# Patient Record
Sex: MALE | Race: BLACK OR AFRICAN AMERICAN
[De-identification: names, ages, dates, MRNs, and addresses within clinical notes are randomized per-mention and may not be internally consistent; named-entity substitution may affect disease eponyms.]

---

## 2018-01-14 ENCOUNTER — HOSPITAL ENCOUNTER (OUTPATIENT)
Dept: HOSPITAL 17 - NEPE | Age: 63
Setting detail: OBSERVATION
LOS: 1 days | Discharge: HOME | End: 2018-01-15
Payer: OTHER GOVERNMENT

## 2018-01-14 VITALS — WEIGHT: 198.42 LBS | HEIGHT: 73 IN | BODY MASS INDEX: 26.3 KG/M2

## 2018-01-14 VITALS
DIASTOLIC BLOOD PRESSURE: 74 MMHG | TEMPERATURE: 98 F | HEART RATE: 79 BPM | OXYGEN SATURATION: 99 % | RESPIRATION RATE: 16 BRPM | SYSTOLIC BLOOD PRESSURE: 153 MMHG

## 2018-01-14 DIAGNOSIS — M54.9: ICD-10-CM

## 2018-01-14 DIAGNOSIS — R06.00: ICD-10-CM

## 2018-01-14 DIAGNOSIS — G89.29: ICD-10-CM

## 2018-01-14 DIAGNOSIS — M25.562: ICD-10-CM

## 2018-01-14 DIAGNOSIS — Z79.82: ICD-10-CM

## 2018-01-14 DIAGNOSIS — R07.89: Primary | ICD-10-CM

## 2018-01-14 DIAGNOSIS — R11.0: ICD-10-CM

## 2018-01-14 DIAGNOSIS — M25.561: ICD-10-CM

## 2018-01-14 DIAGNOSIS — I20.9: ICD-10-CM

## 2018-01-14 LAB
ALBUMIN SERPL-MCNC: 4.1 GM/DL (ref 3.4–5)
ALP SERPL-CCNC: 78 U/L (ref 45–117)
ALT SERPL-CCNC: 41 U/L (ref 12–78)
AST SERPL-CCNC: 28 U/L (ref 15–37)
BASOPHILS # BLD AUTO: 0.1 TH/MM3 (ref 0–0.2)
BASOPHILS NFR BLD: 0.7 % (ref 0–2)
BILIRUB SERPL-MCNC: 0.3 MG/DL (ref 0.2–1)
BUN SERPL-MCNC: 12 MG/DL (ref 7–18)
CALCIUM SERPL-MCNC: 9.4 MG/DL (ref 8.5–10.1)
CHLORIDE SERPL-SCNC: 103 MEQ/L (ref 98–107)
CREAT SERPL-MCNC: 1.04 MG/DL (ref 0.6–1.3)
EOSINOPHIL # BLD: 0.3 TH/MM3 (ref 0–0.4)
EOSINOPHIL NFR BLD: 3.2 % (ref 0–4)
ERYTHROCYTE [DISTWIDTH] IN BLOOD BY AUTOMATED COUNT: 12.1 % (ref 11.6–17.2)
GFR SERPLBLD BASED ON 1.73 SQ M-ARVRAT: 73 ML/MIN (ref 89–?)
GLUCOSE SERPL-MCNC: 93 MG/DL (ref 74–106)
HCO3 BLD-SCNC: 28.8 MEQ/L (ref 21–32)
HCT VFR BLD CALC: 39.3 % (ref 39–51)
HGB BLD-MCNC: 13.5 GM/DL (ref 13–17)
INR PPP: 1 RATIO
LYMPHOCYTES # BLD AUTO: 2.5 TH/MM3 (ref 1–4.8)
LYMPHOCYTES NFR BLD AUTO: 24.7 % (ref 9–44)
MAGNESIUM SERPL-MCNC: 2.2 MG/DL (ref 1.5–2.5)
MCH RBC QN AUTO: 31.9 PG (ref 27–34)
MCHC RBC AUTO-ENTMCNC: 34.3 % (ref 32–36)
MCV RBC AUTO: 93 FL (ref 80–100)
MONOCYTE #: 0.8 TH/MM3 (ref 0–0.9)
MONOCYTES NFR BLD: 8.1 % (ref 0–8)
NEUTROPHILS # BLD AUTO: 6.3 TH/MM3 (ref 1.8–7.7)
NEUTROPHILS NFR BLD AUTO: 63.3 % (ref 16–70)
PLATELET # BLD: 246 TH/MM3 (ref 150–450)
PMV BLD AUTO: 8.1 FL (ref 7–11)
PROT SERPL-MCNC: 8.5 GM/DL (ref 6.4–8.2)
PROTHROMBIN TIME: 10.3 SEC (ref 9.8–11.6)
RBC # BLD AUTO: 4.23 MIL/MM3 (ref 4.5–5.9)
SODIUM SERPL-SCNC: 139 MEQ/L (ref 136–145)
TROPONIN I SERPL-MCNC: (no result) NG/ML (ref 0.02–0.05)
WBC # BLD AUTO: 10 TH/MM3 (ref 4–11)

## 2018-01-14 PROCEDURE — 96376 TX/PRO/DX INJ SAME DRUG ADON: CPT

## 2018-01-14 PROCEDURE — 85610 PROTHROMBIN TIME: CPT

## 2018-01-14 PROCEDURE — 80053 COMPREHEN METABOLIC PANEL: CPT

## 2018-01-14 PROCEDURE — 82552 ASSAY OF CPK IN BLOOD: CPT

## 2018-01-14 PROCEDURE — 82550 ASSAY OF CK (CPK): CPT

## 2018-01-14 PROCEDURE — 71045 X-RAY EXAM CHEST 1 VIEW: CPT

## 2018-01-14 PROCEDURE — 93017 CV STRESS TEST TRACING ONLY: CPT

## 2018-01-14 PROCEDURE — 83735 ASSAY OF MAGNESIUM: CPT

## 2018-01-14 PROCEDURE — 78452 HT MUSCLE IMAGE SPECT MULT: CPT

## 2018-01-14 PROCEDURE — G0378 HOSPITAL OBSERVATION PER HR: HCPCS

## 2018-01-14 PROCEDURE — 85025 COMPLETE CBC W/AUTO DIFF WBC: CPT

## 2018-01-14 PROCEDURE — 96374 THER/PROPH/DIAG INJ IV PUSH: CPT

## 2018-01-14 PROCEDURE — 99285 EMERGENCY DEPT VISIT HI MDM: CPT

## 2018-01-14 PROCEDURE — 84484 ASSAY OF TROPONIN QUANT: CPT

## 2018-01-14 PROCEDURE — 85730 THROMBOPLASTIN TIME PARTIAL: CPT

## 2018-01-14 PROCEDURE — 93005 ELECTROCARDIOGRAM TRACING: CPT

## 2018-01-14 PROCEDURE — A9502 TC99M TETROFOSMIN: HCPCS

## 2018-01-14 RX ADMIN — NITROGLYCERIN SCH MG: 0.4 TABLET SUBLINGUAL at 22:50

## 2018-01-14 RX ADMIN — NITROGLYCERIN SCH MG: 0.4 TABLET SUBLINGUAL at 22:55

## 2018-01-14 NOTE — PD
HPI


Chief Complaint:  Chest Pain


Time Seen by Provider:  22:43


Travel History


International Travel<30 days:  No


Contact w/Intl Traveler<30days:  No


Traveled to known affect area:  No





History of Present Illness


HPI


62-year-old Afro-American [facility], presents the emergency 

department via POV with substernal chest pain and tightness which started 

roughly 2 hours prior to arrival.  Patient states he takes baby aspirin daily, 

and has had chest pain in the past but denies cardiac issues.  Patient states 

this occurred while at rest watching the football milligrams afternoon.  He 

states is continued and currently as a 6 out of 10 pain.  He denies nausea, 

vomiting, or back pain.  He denies diaphoresis.  Patient states he recently 

moved here to the [facility] from [place] several months ago.  He has no known 

drug allergies.





[facility]


Past Medical History


Medical History:  Denies Significant Hx


Pregnant?:  Not Pregnant





Past Surgical History


Abdominal Surgery:  Yes (HERNIA SURGERY )


Other Surgery:  Yes (KNEE SURGERY )





Social History


Alcohol Use:  No


Tobacco Use:  No


Substance Use:  No





Allergies-Medications


(Allergen,Severity, Reaction):  


Coded Allergies:  


     No Known Allergies (Verified  Allergy, Unknown, 1/14/18)


Reported Meds & Prescriptions





Reported Meds & Active Scripts


Active


No Active Prescriptions or Reported Medications    








Review of Systems


Except as stated in HPI:  all other systems reviewed are Neg


General / Constitutional:  No: Fever, Chills


Eyes:  No: Visual changes


HENT:  No: Headaches


Cardiovascular:  Positive: Chest Pain or Discomfort, Other (chest tightness), No

: Palpitations, Irregular Rhythm, Tachycardia, Diaphoresis, Syncope, Dyspnea on 

exertion, Varicosities, Edema


Respiratory:  No: Cough, Shortness of Breath, Wheezing


Gastrointestinal:  No: Nausea, Vomiting, Diarrhea, Abdominal Pain


Genitourinary:  No: Dysuria


Musculoskeletal:  No: Pain


Skin:  No Rash


Neurologic:  No: Weakness


Psychiatric:  No: Depression


Endocrine:  No: Polydipsia


Hematologic/Lymphatic:  No: Easy Bruising





Physical Exam


Narrative


GENERAL: The patient appears mildly anxious.


SKIN: Warm and dry.  Normal color.  Normal turgor.  No diaphoresis


HEAD: Atraumatic. Normocephalic. 


EYES: Pupils equal and round. No scleral icterus. No injection or drainage. 


ENT: No nasal bleeding or discharge.  Mucous membranes pink and moist.  Pharynx 

is clear.  Airway is patent.


NECK: Trachea midline. No JVD.  Supple and nontender.


CARDIOVASCULAR: Regular rate and rhythm.  No murmurs gallops or rubs 

appreciated.  


RESPIRATORY: No accessory muscle use. Clear to auscultation. Breath sounds 

equal bilaterally. 


GASTROINTESTINAL: Abdomen soft, non-tender, nondistended. Hepatic and splenic 

margins not palpable. 


MUSCULOSKELETAL: Extremities without clubbing, cyanosis, or edema. No obvious 

deformities. 


NEUROLOGICAL: Awake and alert. No obvious cranial nerve deficits.  Motor 

grossly within normal limits. Five out of 5 muscle strength in the arms and 

legs.  Normal speech.


PSYCHIATRIC: Appropriate mood and affect; insight and judgment normal.





Data


Data


Last Documented VS





Vital Signs








  Date Time  Temp Pulse Resp B/P (MAP) Pulse Ox O2 Delivery O2 Flow Rate FiO2


 


1/14/18 22:04 98.0 79 16 153/74 (100) 99 Room Air  








Orders





 Orders


Electrocardiogram (1/14/18 22:43)


Ckmb (Isoenzyme) Profile (1/14/18 22:43)


Complete Blood Count With Diff (1/14/18 22:43)


Comprehensive Metabolic Panel (1/14/18 22:43)


Magnesium (Mg) (1/14/18 22:43)


Prothrombin Time / Inr (Pt) (1/14/18 22:43)


Act Partial Throm Time (Ptt) (1/14/18 22:43)


Troponin I (1/14/18 22:43)


Chest, Single Ap (1/14/18 22:43)


Ecg Monitoring (1/14/18 22:43)


Bilateral Bp Monitoring (1/14/18 22:43)


Iv Access Insert/Monitor (1/14/18 22:43)


Oximetry (1/14/18 22:43)


Oxygen Administration (1/14/18 22:43)


Aspirin Chew (Aspirin Chew) (1/14/18 22:45)


Sodium Chloride 0.9% Flush (Ns Flush) (1/14/18 22:45)


Nitroglycerin Sl (Nitrostat Sl) (1/14/18 22:45)


Sodium Chlorid 0.9% 500 Ml Inj (Ns 500 M (1/14/18 22:45)


Morphine Inj (Morphine Inj) (1/14/18 22:45)


CKMB (1/14/18 23:00)


CKMB% (1/14/18 23:00)


Admit Order (Ed Use Only) (1/15/18 00:49)





Labs





Laboratory Tests








Test


  1/14/18


23:00


 


White Blood Count 10.0 TH/MM3 


 


Red Blood Count 4.23 MIL/MM3 


 


Hemoglobin 13.5 GM/DL 


 


Hematocrit 39.3 % 


 


Mean Corpuscular Volume 93.0 FL 


 


Mean Corpuscular Hemoglobin 31.9 PG 


 


Mean Corpuscular Hemoglobin


Concent 34.3 % 


 


 


Red Cell Distribution Width 12.1 % 


 


Platelet Count 246 TH/MM3 


 


Mean Platelet Volume 8.1 FL 


 


Neutrophils (%) (Auto) 63.3 % 


 


Lymphocytes (%) (Auto) 24.7 % 


 


Monocytes (%) (Auto) 8.1 % 


 


Eosinophils (%) (Auto) 3.2 % 


 


Basophils (%) (Auto) 0.7 % 


 


Neutrophils # (Auto) 6.3 TH/MM3 


 


Lymphocytes # (Auto) 2.5 TH/MM3 


 


Monocytes # (Auto) 0.8 TH/MM3 


 


Eosinophils # (Auto) 0.3 TH/MM3 


 


Basophils # (Auto) 0.1 TH/MM3 


 


CBC Comment DIFF FINAL 


 


Differential Comment  


 


Prothrombin Time 10.3 SEC 


 


Prothromb Time International


Ratio 1.0 RATIO 


 


 


Activated Partial


Thromboplast Time 26.2 SEC 


 


 


Blood Urea Nitrogen 12 MG/DL 


 


Creatinine 1.04 MG/DL 


 


Random Glucose 93 MG/DL 


 


Total Protein 8.5 GM/DL 


 


Albumin 4.1 GM/DL 


 


Calcium Level 9.4 MG/DL 


 


Magnesium Level 2.2 MG/DL 


 


Alkaline Phosphatase 78 U/L 


 


Aspartate Amino Transf


(AST/SGOT) 28 U/L 


 


 


Alanine Aminotransferase


(ALT/SGPT) 41 U/L 


 


 


Total Bilirubin 0.3 MG/DL 


 


Sodium Level 139 MEQ/L 


 


Potassium Level 4.1 MEQ/L 


 


Chloride Level 103 MEQ/L 


 


Carbon Dioxide Level 28.8 MEQ/L 


 


Anion Gap 7 MEQ/L 


 


Estimat Glomerular Filtration


Rate 73 ML/MIN 


 


 


Total Creatine Kinase 346 U/L 


 


Creatine Kinase MB 2.9 NG/ML 


 


Creatine Kinase MB % 0.8 % 


 


Troponin I


  LESS THAN 0.02


NG/ML











MDM


Medical Decision Making


Medical Screen Exam Complete:  Yes


Emergency Medical Condition:  Yes


Differential Diagnosis


Chest pain.  Chest tightness.  Cardiac syndrome.  Angina.


Narrative Course


Patient is medically stable at time of exam.


EKG shows normal sinus rhythm with flipped T waves in V1 with possible slight 

ST elevations and V2 and V3.  This was reviewed with Dr. Carrasco as Dr. Wheeler 

was putting in in a central line.  She felt the ST changes were nonspecific


Cardiac protocol is initiated.  Labs ordered including CBC, CMP, cardiac panel, 

coagulation studies.


IV access is obtained patient is given a 500 mL normal saline bolus.


Patient is given 324 mg chewable aspirin by mouth.


Patient is given 2 mg morphine IV as well as nitroglycerin 0.4 mg sublingual 3.


Chest x-ray is ordered.


2300 hrs., care of the patient is turned over to Dr. Wheeler, who will determine 

the final disposition of the patient.


Scripts


No Active Prescriptions or Reported Meds


Condition:  Stable











Fernando Reece Jan 14, 2018 22:46

## 2018-01-14 NOTE — RADRPT
EXAM DATE/TIME:  01/14/2018 22:47 

 

[facility] COMPARISON:     

No previous studies available for comparison.

 

                     

INDICATIONS :     

Left sided chest pain for one day.

                     

 

MEDICAL HISTORY :     

None.          

 

SURGICAL HISTORY :     

None.   

 

ENCOUNTER:     

Initial                                        

 

ACUITY:     

1 day      

 

PAIN SCORE:     

6/10

 

LOCATION:     

Left chest 

 

FINDINGS:     

A single view of the chest demonstrates the lungs to be symmetrically aerated without evidence of mas
s, infiltrate or effusion.  The cardiomediastinal contours are unremarkable.  Osseous structures are 
intact.

 

CONCLUSION:     

No evidence of acute cardiopulmonary disease.

 

 

 

 Edy Rooney MD on January 14, 2018 at 22:55           

Board Certified Radiologist.

 This report was verified electronically.

## 2018-01-14 NOTE — PD
Data


Data


Last Documented VS





Vital Signs








  Date Time  Temp Pulse Resp B/P (MAP) Pulse Ox O2 Delivery O2 Flow Rate FiO2


 


1/14/18 22:04 98.0 79 16 153/74 (100) 99 Room Air  








Orders





 Orders


Electrocardiogram (1/14/18 22:43)


Ckmb (Isoenzyme) Profile (1/14/18 22:43)


Complete Blood Count With Diff (1/14/18 22:43)


Comprehensive Metabolic Panel (1/14/18 22:43)


Magnesium (Mg) (1/14/18 22:43)


Prothrombin Time / Inr (Pt) (1/14/18 22:43)


Act Partial Throm Time (Ptt) (1/14/18 22:43)


Troponin I (1/14/18 22:43)


Chest, Single Ap (1/14/18 22:43)


Ecg Monitoring (1/14/18 22:43)


Bilateral Bp Monitoring (1/14/18 22:43)


Iv Access Insert/Monitor (1/14/18 22:43)


Oximetry (1/14/18 22:43)


Oxygen Administration (1/14/18 22:43)


Aspirin Chew (Aspirin Chew) (1/14/18 22:45)


Sodium Chloride 0.9% Flush (Ns Flush) (1/14/18 22:45)


Nitroglycerin Sl (Nitrostat Sl) (1/14/18 22:45)


Sodium Chlorid 0.9% 500 Ml Inj (Ns 500 M (1/14/18 22:45)


Morphine Inj (Morphine Inj) (1/14/18 22:45)


CKMB (1/14/18 23:00)


CKMB% (1/14/18 23:00)


Admit Order (Ed Use Only) (1/15/18 00:49)





Labs





Laboratory Tests








Test


  1/14/18


23:00


 


White Blood Count 10.0 TH/MM3 


 


Red Blood Count 4.23 MIL/MM3 


 


Hemoglobin 13.5 GM/DL 


 


Hematocrit 39.3 % 


 


Mean Corpuscular Volume 93.0 FL 


 


Mean Corpuscular Hemoglobin 31.9 PG 


 


Mean Corpuscular Hemoglobin


Concent 34.3 % 


 


 


Red Cell Distribution Width 12.1 % 


 


Platelet Count 246 TH/MM3 


 


Mean Platelet Volume 8.1 FL 


 


Neutrophils (%) (Auto) 63.3 % 


 


Lymphocytes (%) (Auto) 24.7 % 


 


Monocytes (%) (Auto) 8.1 % 


 


Eosinophils (%) (Auto) 3.2 % 


 


Basophils (%) (Auto) 0.7 % 


 


Neutrophils # (Auto) 6.3 TH/MM3 


 


Lymphocytes # (Auto) 2.5 TH/MM3 


 


Monocytes # (Auto) 0.8 TH/MM3 


 


Eosinophils # (Auto) 0.3 TH/MM3 


 


Basophils # (Auto) 0.1 TH/MM3 


 


CBC Comment DIFF FINAL 


 


Differential Comment  


 


Prothrombin Time 10.3 SEC 


 


Prothromb Time International


Ratio 1.0 RATIO 


 


 


Activated Partial


Thromboplast Time 26.2 SEC 


 


 


Blood Urea Nitrogen 12 MG/DL 


 


Creatinine 1.04 MG/DL 


 


Random Glucose 93 MG/DL 


 


Total Protein 8.5 GM/DL 


 


Albumin 4.1 GM/DL 


 


Calcium Level 9.4 MG/DL 


 


Magnesium Level 2.2 MG/DL 


 


Alkaline Phosphatase 78 U/L 


 


Aspartate Amino Transf


(AST/SGOT) 28 U/L 


 


 


Alanine Aminotransferase


(ALT/SGPT) 41 U/L 


 


 


Total Bilirubin 0.3 MG/DL 


 


Sodium Level 139 MEQ/L 


 


Potassium Level 4.1 MEQ/L 


 


Chloride Level 103 MEQ/L 


 


Carbon Dioxide Level 28.8 MEQ/L 


 


Anion Gap 7 MEQ/L 


 


Estimat Glomerular Filtration


Rate 73 ML/MIN 


 


 


Total Creatine Kinase 346 U/L 


 


Creatine Kinase MB 2.9 NG/ML 


 


Creatine Kinase MB % 0.8 % 


 


Troponin I


  LESS THAN 0.02


NG/ML











[facility]


Medical Record Reviewed:  Yes


Supervised Visit with CRIS:  No


Interpretation(s)





Last Impressions








Chest X-Ray 1/14/18 2243 Signed





Impressions: 





 Service Date/Time:  Sunday, January 14, 2018 22:47 - CONCLUSION:  No evidence 

of 





 acute cardiopulmonary disease.     Edy Rooney MD 








Narrative Course


During the course of the patients emergency department visit, the patients 

history, examination, and differential diagnosis were reviewed with the 

patient. The patient was placed on a cardiac monitor with oximetry and frequent 

blood pressure monitoring. The patient had  IV access obtained and blood work 

sent for analysis.  The patient's case was checked out to me by Fernando, the 

physician assistant.  Please see his complete history and physical.  The patient

's case is checked out to me at the conclusion of his shift.  The patient 

presented with reports of chest pain/pressure.  The patient had an EKG done on 

arrival that shows a sinus rhythm heart rate of 68, QRS duration is 95 ms, QTC 

373 ms, no acute ST segment elevation is noted.  T waves are inverted in V1.  J-

point elevation is noted in lead V2.  No reciprocal ST segment depression to 

suggest an acute infarct.





The patient was initially provided Zofran 4 mg IV, morphine 2 mg IV for pain, 

aspirin 324 milligrams, nitroglycerin sublingual every 5 minutes 3 when 

necessary chest pain.





The patients laboratory studies were reviewed and remarkable for a CBC that is 

unremarkable, CMP is remarkable for a GFR 73, , MB percent 0.8, troponin 

I less than 0.02, PT 10.3, PTT 26.2.





Radiology studies were reviewed and remarkable for a chest x-ray that shows no 

acute cardiopulmonary disease.





The patient was agreeable with the plan to proceed with admission to the chest 

pain center for rule out serial cardiac enzyme protocol followed by stress 

testing.





The patients results were discussed with the patient, including the plan of 

care. I explained that further testing and/ or monitoring is indicated based on 

the patients history, examination, and/ or laboratory findings. Therefore, I 

recommended admission for additional evaluation. The patient expressed 

understanding and was agreeable with this plan. The patient was admitted to the 

hospital in stable condition and sent to a bed under the care of the chest pain 

center.


Diagnosis





 Primary Impression:  


 Chest pain, rule out acute myocardial infarction





Admitting Information


Admitting Physician Requests:  Observation


Scripts


No Active Prescriptions or Reported Meds


Condition:  Stable











Genesis Wheeler MD Jan 14, 2018 23:02

## 2018-01-15 VITALS
SYSTOLIC BLOOD PRESSURE: 134 MMHG | RESPIRATION RATE: 18 BRPM | OXYGEN SATURATION: 99 % | DIASTOLIC BLOOD PRESSURE: 59 MMHG | TEMPERATURE: 97.7 F | HEART RATE: 82 BPM

## 2018-01-15 VITALS
HEART RATE: 83 BPM | SYSTOLIC BLOOD PRESSURE: 110 MMHG | RESPIRATION RATE: 16 BRPM | DIASTOLIC BLOOD PRESSURE: 88 MMHG | OXYGEN SATURATION: 99 %

## 2018-01-15 VITALS
RESPIRATION RATE: 18 BRPM | SYSTOLIC BLOOD PRESSURE: 125 MMHG | OXYGEN SATURATION: 99 % | HEART RATE: 67 BPM | DIASTOLIC BLOOD PRESSURE: 59 MMHG

## 2018-01-15 VITALS
DIASTOLIC BLOOD PRESSURE: 63 MMHG | SYSTOLIC BLOOD PRESSURE: 137 MMHG | OXYGEN SATURATION: 98 % | HEART RATE: 68 BPM | RESPIRATION RATE: 18 BRPM | TEMPERATURE: 98.1 F

## 2018-01-15 VITALS
OXYGEN SATURATION: 100 % | RESPIRATION RATE: 18 BRPM | HEART RATE: 73 BPM | SYSTOLIC BLOOD PRESSURE: 126 MMHG | DIASTOLIC BLOOD PRESSURE: 72 MMHG

## 2018-01-15 VITALS — OXYGEN SATURATION: 100 %

## 2018-01-15 LAB
TROPONIN I SERPL-MCNC: (no result) NG/ML (ref 0.02–0.05)
TROPONIN I SERPL-MCNC: (no result) NG/ML (ref 0.02–0.05)

## 2018-01-15 RX ADMIN — NITROGLYCERIN SCH MG: 0.4 TABLET SUBLINGUAL at 00:21

## 2018-01-15 NOTE — EKG
Date Performed: 01/14/2018       Time Performed: 22:46:16

 

PTAGE:      62 years

 

EKG:      Sinus rhythm 

 

 POSSIBLE LEFT ATRIAL ENLARGEMENT BORDERLINE ECG 

 

NO PREVIOUS TRACING            

 

DOCTOR:   Davy Bradshaw  Interpretating Date/Time  01/15/2018 15:10:58

## 2018-01-15 NOTE — EKG
Date Performed: 01/15/2018       Time Performed: 06:26:42

 

PTAGE:      62 years

 

EKG:      Sinus rhythm 

 

 NORMAL ECG

 

PREVIOUS TRACING       : 01/15/2018 05.08 \spt

 

DOCTOR:   Davy Bradshaw  Interpretating Date/Time  01/15/2018 14:57:11

## 2018-01-15 NOTE — EKG
Date Performed: 01/15/2018       Time Performed: 05:08:40

 

PTAGE:      62 years

 

EKG:      Sinus rhythm 

 

 POSSIBLE LEFT ATRIAL ENLARGEMENT BORDERLINE ECG

 

PREVIOUS TRACING       : 01/15/2018 02.15 Since previous tracing, no significant change noted

 

DOCTOR:   Davy Bradshaw  Interpretating Date/Time  01/17/2018 07:42:58

## 2018-01-15 NOTE — EKG
Date Performed: 01/15/2018       Time Performed: 02:15:49

 

PTAGE:      62 years

 

EKG:      Sinus rhythm 

 

 BORDERLINE ECG

 

PREVIOUS TRACING       : 01/14/2018 22.46 Since previous tracing, no significant change noted

 

DOCTOR:   Davy Bradshaw  Interpretating Date/Time  01/15/2018 14:59:13

## 2018-01-15 NOTE — TR
Date Performed: 01/15/2018       Time Performed: 12:22:16

 

DOCTOR:      Davy Bradshaw 

 

DRUG LIST:     

CLINICAL HISTORY:     

REASON FOR TEST:     

REASON FOR ENDING:     

OBSERVATION:     

CONCLUSION:      Lexiscan stress test was performed under standard four minute protocol.  Radionuclid
e was injected one minute prior to ending the test. No electrocardiographic abormalities were present
 to suggest ischemia. Nuclear imaging and interpretation are pending.

COMMENTS:

## 2018-01-15 NOTE — EKG
Date Performed: 01/15/2018       Time Performed: 08:48:26

 

PTAGE:      62 years

 

EKG:      Sinus rhythm 

 

 NORMAL ECG

 

PREVIOUS TRACING       : 01/15/2018 06.26 Since previous tracing, no significant change noted

 

DOCTOR:   Davy Bradshaw  Interpretating Date/Time  01/15/2018 14:55:29

## 2018-01-15 NOTE — RADRPT
EXAM DATE/TIME:  01/15/2018 11:47 

 

[facility] COMPARISON:     

CHEST SINGLE AP, January 14, 2018, 22:47.

 

 

INDICATIONS :     

Substernal chest pain. Angina. 

                           

 

DOSE:     

27.2 mCi Tc99m Myoview at stress.

                     8.8 mCi Tc99m Myoview at rest.

                     0.4 mg Lexiscan

                       

 

 

STRESS SYMPTOMS:      

Dyspnea and stomach pressure.

                       

 

 

EJECTION FRACTION:       

63%

                       

 

MEDICAL HISTORY :     

None   

 

SURGICAL HISTORY :      

Inguinal hernia repair.   

 

ENCOUNTER:     

Initial

 

ACUITY:     

1 day

 

PAIN SCALE:     

8/10

 

LOCATION:      

Substernal chest 

 

TECHNIQUE:     

The patient underwent pharmacologic stress with infusion of prescribed dose.  Continuous ECG tracing 
was monitored during stress.  Gated SPECT imaging was performed after stress and conventional SPECT i
maging was performed at rest.  The examination was performed on a SPECT/CT scanner, both attenuation 
and non-corrected datasets were reviewed.

 

FINDINGS:     

 

DISTRIBUTION:     

The maximum perfused segment at stress is in the anterior wall.

 

PERFUSION STUDY:     

No fixed or reversible perfusion defect is identified. Left ventricular cavity appears within normal 
for size.

 

GATED STUDY:     

There is intact wall motion and thickening without hypokinetic or dyskinetic segments. 

 

CONCLUSION:     

1. No fixed or reversible perfusion defect is identified.

2. Normal left ventricle wall motion and ejection fraction.

 

RISK CATEGORY:     Low (<1% Annual Mortality Rate)

 

 

 

 

 Edy Delacruz MD on January 15, 2018 at 14:32           

Board Certified Radiologist.

 This report was verified electronically.

## 2018-01-15 NOTE — HHI.HP
HPI


Primary Care Physician


[facility]


Chief Complaint


Chest pain


History of Present Illness


62-year-old male without significant past medical history presents to emergency 

room for further evaluation of chest tightness.  Onset yesterday afternoon 

while watching football.  Location substernal.  Characterized as tightness.  

Radiation to left arm, noting left arm to be "sore."  Severity mild.  Duration 

constant.  Associated symptoms nausea.  Denied vomiting, shortness breath, or 

dyspnea.  No known precipitating or relieving factors.  No recent injury or 

trauma.  Denies similar pain in the past, however endorses sharp chest pain in 

the past. Seen and evaluated during each of these episodes with both times 

being non cardiac related. Recently moved from [place], [place] to [place].





Review of Systems


General: No fatigue,weakness, fever, chills, or recent illness change in 

appetite.  Has been his general state of health.


HEENT: No HA, no vision changes, no nasal congestion or drainage, no dysphasia


CV: Continues to have chest tightness as stated above.  


RESP: No SOB, cough, wheeze, or recent URI.


GI: No nausea, vomiting, bowel changes


: No dysuria, urgency, frequency


EXT: No lower leg edema, no paraesthesias


MS: No discomfort, change in ROM, injury, or trauma.


NEURO: No difficulty with balance, LOC, motor/sensory deficits


PSYCH: No anxiety, depression


SKIN: No rashes, no concerning lesions





Past Family Social History


Allergies:  


Coded Allergies:  


     No Known Allergies (Verified  Allergy, Unknown, 18)


Past Medical History


Chronic back and bilateral knee pain


Past Surgical History


Hernia surgery, knee surgery


Reported Medications


Aspirin 81 mg daily


Active Ordered Medications





Current Medications








 Medications


  (Trade)  Dose


 Ordered  Sig/Cas


 Route  Start Time


 Stop Time Status Last Admin


 


  (NS Flush)  2 ml  UNSCH  PRN


 IV FLUSH  1/15/18 01:45


     


 


 


  (NS Flush)  2 ml  BID


 IV FLUSH  1/15/18 09:00


    1/15/18 08:00


 


 


  (Tylenol)  500 mg  Q4H  PRN


 PO  1/15/18 01:45


     


 


 


  (Zofran Inj)  4 mg  Q6H  PRN


 IV PUSH  1/15/18 01:45


     


 


 


  (Nitrostat Sl)  0.4 mg  Q5M  PRN


 SL  1/15/18 08:45


     


 


 


  (Aspirin)  325 mg  DAILY


 PO  1/15/18 09:00


    1/15/18 09:48


 








Social History


No known coronary artery disease, diabetes, hypertension, or hyperlipidemia.


Lifelong nonsmoker. Denies any alcohol or illegal drug use.


Disable . 





Past cardiac testing


None





Physical Exam


Vital Signs





Vital Signs








  Date Time  Temp Pulse Resp B/P (MAP) Pulse Ox O2 Delivery O2 Flow Rate FiO2


 


1/15/18 09:49  83 16 110/88 (95) 99   


 


1/15/18 06:43  73 18 126/72 (90) 100 Room Air  


 


1/15/18 06:31     100 Room Air  


 


1/15/18 06:31     98 Nasal Cannula 2.00 


 


1/15/18 06:30 98.1 68 18 137/63 (87) 98 Room Air  


 


1/15/18 02:03  67 18 125/59 (81) 99 Room Air  


 


18 22:04 98.0 79 16 153/74 (100) 99 Room Air  








Physical Exam


GENERAL: Alert WN, WD, NAD, pleasant,  Tiffany male


HEAD: NC, AT


CV: RRR, without murmur, rub, gallop, no JVD, S1-S2 no S3-S4. Chest wall 

nontender with palpation. 


RESP: Clear lungs throughout bilateral, no crackles, wheeze, rhonchi, 

symmetrical chest rise, nonlabored, able to speak in full sentences


ABD: Soft, NT, ND, no masses, positive bowel tones


EXT: Pulses +24, no dependent edema


MS: Normal tone 4 extremities, nontender, no obvious deformities, full range 

of motion


NEURO: CN II through CN XII grossly intact, motor strength 5/5


PSYCH: A+O 3, pleasant affect, appropriate speech, mood, insight and judgment


SKIN: Normal turgor, normal texture, no lesions, no rashes


Laboratory





Laboratory Tests








Test


  18


23:00 1/15/18


02:24 1/15/18


05:10


 


White Blood Count 10.0   


 


Red Blood Count 4.23   


 


Hemoglobin 13.5   


 


Hematocrit 39.3   


 


Mean Corpuscular Volume 93.0   


 


Mean Corpuscular Hemoglobin 31.9   


 


Mean Corpuscular Hemoglobin


Concent 34.3 


  


  


 


 


Red Cell Distribution Width 12.1   


 


Platelet Count 246   


 


Mean Platelet Volume 8.1   


 


Neutrophils (%) (Auto) 63.3   


 


Lymphocytes (%) (Auto) 24.7   


 


Monocytes (%) (Auto) 8.1   


 


Eosinophils (%) (Auto) 3.2   


 


Basophils (%) (Auto) 0.7   


 


Neutrophils # (Auto) 6.3   


 


Lymphocytes # (Auto) 2.5   


 


Monocytes # (Auto) 0.8   


 


Eosinophils # (Auto) 0.3   


 


Basophils # (Auto) 0.1   


 


CBC Comment DIFF FINAL   


 


Differential Comment    


 


Prothrombin Time 10.3   


 


Prothromb Time International


Ratio 1.0 


  


  


 


 


Activated Partial


Thromboplast Time 26.2 


  


  


 


 


Blood Urea Nitrogen 12   


 


Creatinine 1.04   


 


Random Glucose 93   


 


Total Protein 8.5   


 


Albumin 4.1   


 


Calcium Level 9.4   


 


Magnesium Level 2.2   


 


Alkaline Phosphatase 78   


 


Aspartate Amino Transf


(AST/SGOT) 28 


  


  


 


 


Alanine Aminotransferase


(ALT/SGPT) 41 


  


  


 


 


Total Bilirubin 0.3   


 


Sodium Level 139   


 


Potassium Level 4.1   


 


Chloride Level 103   


 


Carbon Dioxide Level 28.8   


 


Anion Gap 7   


 


Estimat Glomerular Filtration


Rate 73 


  


  


 


 


Total Creatine Kinase 346  300  283 


 


Creatine Kinase MB 2.9  2.7  2.6 


 


Creatine Kinase MB % 0.8   


 


Troponin I LESS THAN 0.02  LESS THAN 0.02  LESS THAN 0.02 








Result Diagram:  


18





Imaging





Last 48 hours Impressions








Chest X-Ray 18 Signed





Impressions: 





 Service Date/Time:  2018 22:47 - CONCLUSION:  No evidence 

of 





 acute cardiopulmonary disease.     Edy Rooney MD 








Course


EKG


NSR, normal axis, J point elevation V2 and minimal in V3, may represent early 

repolarization, otherwise no st t segment changes.





Caprini VTE Risk Assessment


Caprini VTE Risk Assessment:  No/Low Risk (score <= 1)


Caprini Risk Assessment Model











 Point Value = 1          Point Value = 2  Point Value = 3  Point Value = 5


 


Age 41-60


Minor surgery


BMI > 25 kg/m2


Swollen legs


Varicose veins


Pregnancy or postpartum


History of unexplained or recurrent


   spontaneous 


Oral contraceptives or hormone


   replacement


Sepsis (< 1 month)


Serious lung disease, including


   pneumonia (< 1 month)


Abnormal pulmonary function


Acute myocardial infarction


Congestive heart failure (< 1 month)


History of inflammatory bowel disease


Medical patient at bed rest Age 61-74


Arthroscopic surgery


Major open surgery (> 45 min)


Laparoscopic surgery (> 45 min)


Malignancy


Confined to bed (> 72 hours)


Immobilizing plaster cast


Central venous access Age >= 75


History of VTE


Family history of VTE


Factor V Leiden


Prothrombin 23303E


Lupus anticoagulant


Anticardiolipin antibodies


Elevated serum homocysteine


Heparin-induced thrombocytopenia


Other congenital or acquired


   thrombophilia Stroke (< 1 month)


Elective arthroplasty


Hip, pelvis, or leg fracture


Acute spinal cord injury (< 1 month)








Prophylaxis Regimen











   Total Risk


Factor Score Risk Level Prophylaxis Regimen


 


0-1      Low Early ambulation


 


2 Moderate Order ONE of the following:


*Sequential Compression Device (SCD)


*Heparin 5000 units SQ BID


 


3-4 Higher Order ONE of the following medications:


*Heparin 5000 units SQ TID


*Enoxaparin/Lovenox 40 mg SQ daily (WT < 150 kg, CrCl > 30 mL/min)


*Enoxaparin/Lovenox 30 mg SQ daily (WT < 150 kg, CrCl > 10-29 mL/min)


*Enoxaparin/Lovenox 30 mg SQ BID (WT < 150 kg, CrCl > 30 mL/min)


AND/OR


*Sequential Compression Device (SCD)


 


5 or more Highest Order ONE of the following medications:


*Heparin 5000 units SQ TID (Preferred with Epidurals)


*Enoxaparin/Lovenox 40 mg SQ daily (WT < 150 kg, CrCl > 30 mL/min)


*Enoxaparin/Lovenox 30 mg SQ daily (WT < 150 kg, CrCl > 10-29 mL/min)


*Enoxaparin/Lovenox 30 mg SQ BID (WT < 150 kg, CrCl > 30 mL/min)


AND


*Sequential Compression Device (SCD)











Assessment and Plan


Assessment and Plan


#1 Atypical chest pain-admitted to chest pain center. Ruled out with 3 sets of 

EKG, cardiac enzymes, and monitored overnight. Seen and evaluated by Dr. Davy Bradshaw. Proceed with Lexiscan. If cardiac testing unremarkable, plans to 

discharge home with follow up with his PCP. Patient is agreeable to plan of 

care.











Bita Canas Wisam 15, 2018 10:07

## 2018-01-15 NOTE — HHI.DCPOC
Discharge Care Plan


Diagnosis:  


(1) Atypical chest pain


Goals to Promote Your Health


* To prevent worsening of your condition and complications


* To maintain your health at the optimal level


Directions to Meet Your Goals


*** Take your medications as prescribed


*** Follow your dietary instruction


*** Follow activity as directed








*** Keep your appointments as scheduled


*** Take your immunizations and boosters as scheduled


*** If your symptoms worsen call your PCP, if no PCP go to Urgent Care Center 

or Emergency Room***


*** Smoking is Dangerous to Your Health. Avoid second hand smoke***


***Call the 24-hour hour crisis hotline for domestic abuse at 1-415.432.3577***











Bita Canas Wisam 15, 2018 14:50